# Patient Record
Sex: FEMALE | Race: BLACK OR AFRICAN AMERICAN | NOT HISPANIC OR LATINO
[De-identification: names, ages, dates, MRNs, and addresses within clinical notes are randomized per-mention and may not be internally consistent; named-entity substitution may affect disease eponyms.]

---

## 2022-07-08 ENCOUNTER — APPOINTMENT (OUTPATIENT)
Dept: BARIATRICS | Facility: CLINIC | Age: 62
End: 2022-07-08

## 2022-07-08 VITALS
WEIGHT: 197 LBS | HEIGHT: 62 IN | DIASTOLIC BLOOD PRESSURE: 86 MMHG | BODY MASS INDEX: 36.25 KG/M2 | SYSTOLIC BLOOD PRESSURE: 137 MMHG

## 2022-07-08 DIAGNOSIS — J45.909 UNSPECIFIED ASTHMA, UNCOMPLICATED: ICD-10-CM

## 2022-07-08 DIAGNOSIS — K21.9 GASTRO-ESOPHAGEAL REFLUX DISEASE W/OUT ESOPHAGITIS: ICD-10-CM

## 2022-07-08 PROBLEM — Z00.00 ENCOUNTER FOR PREVENTIVE HEALTH EXAMINATION: Status: ACTIVE | Noted: 2022-07-08

## 2022-07-08 PROCEDURE — 99245 OFF/OP CONSLTJ NEW/EST HI 55: CPT | Mod: NC

## 2022-07-08 NOTE — ASSESSMENT
[FreeTextEntry1] : Assessment \par \par Narration- \par \par \par In preparation of surgery, will order full blood work and vitamin intake, will order esophagram and patient to follow up with our dietitian. Will possibly move forward with gastric bypass. \par \par  \par \par Dr. Florentino Gomez

## 2022-07-08 NOTE — HISTORY OF PRESENT ILLNESS
[de-identified] : Marleen Cantor \par 07/08/2022 \par \par Marleen Cantor, 62 y/o female, a patient in [facility] came in with her Dietitian, patient of Dr. Brown and  Dr. Mitchell from [facility] for an initial consult via telehealth along with our dietitian consultation Samira. Patient is highly intelligent who underwent laparoscopic sleeve gastrectomy in 2010 and at the time went from 241 to 162 and now patient has recidivism approximately at 197 lb giving her  BMI of 36 index. More significantly she reports she  has awaken with aspiration symptoms, post nasal drip, and increase of her asthma medication and she is completely sympathic of GERD. Discussed with the Patient to take 20 mg of Pepcid every night.  Will order an esophagram in order to look into patient's acid reflux  however with respiratory issue conversion of bypass. Divert the bile and acid so it no longer cause the post nasal drip. Retighten the area as well as further weight loss to lower the abdominal pressure. We would want her to come to [place] for one to two possible stay at the hospital and stay at a hotel for the rest  of the week and to follow up with the patient with in that week. Additionally patient will follow up with the dietitian for nutritional guideline. Discussed with the patient the risks, benefits, and  of gastric bypass. Further explained with the patient the risks of bone loss if patient doesn't maintain a healthy diet and taking vitamin supplements. Discussed with the patient the importance of changing to a healthy lifestyle. This surgery is needed due to the complications of her  sleeve gastrectomy and insurance should approve this case and expedite the case as patient needs the gastric bypass.Will order full blood work and vitamin intake to make sure she has vitamin intake.

## 2022-07-18 ENCOUNTER — APPOINTMENT (OUTPATIENT)
Dept: BARIATRICS | Facility: CLINIC | Age: 62
End: 2022-07-18

## 2022-08-17 DIAGNOSIS — Z01.818 ENCOUNTER FOR OTHER PREPROCEDURAL EXAMINATION: ICD-10-CM

## 2022-09-02 VITALS
RESPIRATION RATE: 19 BRPM | HEART RATE: 61 BPM | DIASTOLIC BLOOD PRESSURE: 74 MMHG | HEIGHT: 62 IN | OXYGEN SATURATION: 99 % | SYSTOLIC BLOOD PRESSURE: 110 MMHG | WEIGHT: 194.01 LBS | TEMPERATURE: 98 F

## 2022-09-02 DIAGNOSIS — R94.39 ABNORMAL RESULT OF OTHER CARDIOVASCULAR FUNCTION STUDY: ICD-10-CM

## 2022-09-02 NOTE — PATIENT PROFILE ADULT - FALL HARM RISK - RISK INTERVENTIONS

## 2022-09-02 NOTE — PATIENT PROFILE ADULT - FALL HARM RISK - UNIVERSAL INTERVENTIONS
Bed in lowest position, wheels locked, appropriate side rails in place/Call bell, personal items and telephone in reach/Instruct patient to call for assistance before getting out of bed or chair/Non-slip footwear when patient is out of bed/[place] to call system/Physically safe environment - no spills, clutter or unnecessary equipment/Purposeful Proactive Rounding/Room/bathroom lighting operational, light cord in reach

## 2022-09-05 ENCOUNTER — TRANSCRIPTION ENCOUNTER (OUTPATIENT)
Age: 62
End: 2022-09-05

## 2022-09-06 ENCOUNTER — NON-APPOINTMENT (OUTPATIENT)
Age: 62
End: 2022-09-06

## 2022-09-06 ENCOUNTER — OUTPATIENT (OUTPATIENT)
Dept: OUTPATIENT SERVICES | Facility: HOSPITAL | Age: 62
LOS: 1 days | End: 2022-09-06
Payer: COMMERCIAL

## 2022-09-06 ENCOUNTER — APPOINTMENT (OUTPATIENT)
Dept: BARIATRICS | Facility: HOSPITAL | Age: 62
End: 2022-09-06

## 2022-09-06 ENCOUNTER — APPOINTMENT (OUTPATIENT)
Dept: HEART AND VASCULAR | Facility: CLINIC | Age: 62
End: 2022-09-06

## 2022-09-06 ENCOUNTER — APPOINTMENT (OUTPATIENT)
Dept: CT IMAGING | Facility: HOSPITAL | Age: 62
End: 2022-09-06

## 2022-09-06 ENCOUNTER — INPATIENT (INPATIENT)
Facility: HOSPITAL | Age: 62
LOS: 1 days | Discharge: HOME | DRG: 621 | End: 2022-09-08
Attending: SURGERY | Admitting: SURGERY
Payer: COMMERCIAL

## 2022-09-06 VITALS
BODY MASS INDEX: 35.88 KG/M2 | HEIGHT: 62 IN | DIASTOLIC BLOOD PRESSURE: 56 MMHG | WEIGHT: 195 LBS | HEART RATE: 60 BPM | SYSTOLIC BLOOD PRESSURE: 94 MMHG | OXYGEN SATURATION: 97 %

## 2022-09-06 DIAGNOSIS — Z90.3 ACQUIRED ABSENCE OF STOMACH [PART OF]: Chronic | ICD-10-CM

## 2022-09-06 DIAGNOSIS — E66.01 MORBID (SEVERE) OBESITY DUE TO EXCESS CALORIES: ICD-10-CM

## 2022-09-06 DIAGNOSIS — Z90.710 ACQUIRED ABSENCE OF BOTH CERVIX AND UTERUS: Chronic | ICD-10-CM

## 2022-09-06 DIAGNOSIS — Z98.891 HISTORY OF UTERINE SCAR FROM PREVIOUS SURGERY: Chronic | ICD-10-CM

## 2022-09-06 DIAGNOSIS — J45.909 UNSPECIFIED ASTHMA, UNCOMPLICATED: ICD-10-CM

## 2022-09-06 DIAGNOSIS — K21.9 GASTRO-ESOPHAGEAL REFLUX DISEASE WITHOUT ESOPHAGITIS: ICD-10-CM

## 2022-09-06 PROBLEM — E66.9 OBESITY, UNSPECIFIED: Chronic | Status: ACTIVE | Noted: 2022-09-02

## 2022-09-06 PROBLEM — I10 ESSENTIAL (PRIMARY) HYPERTENSION: Chronic | Status: ACTIVE | Noted: 2022-09-02

## 2022-09-06 PROBLEM — E78.1 PURE HYPERGLYCERIDEMIA: Chronic | Status: ACTIVE | Noted: 2022-09-02

## 2022-09-06 LAB
BLD GP AB SCN SERPL QL: NEGATIVE — SIGNIFICANT CHANGE UP
HCT VFR BLD CALC: 42.2 % — SIGNIFICANT CHANGE UP (ref 34.5–45)
HGB BLD-MCNC: 13.6 G/DL — SIGNIFICANT CHANGE UP (ref 11.5–15.5)
MCHC RBC-ENTMCNC: 28.6 PG — SIGNIFICANT CHANGE UP (ref 27–34)
MCHC RBC-ENTMCNC: 32.2 GM/DL — SIGNIFICANT CHANGE UP (ref 32–36)
MCV RBC AUTO: 88.8 FL — SIGNIFICANT CHANGE UP (ref 80–100)
NRBC # BLD: 0 /100 WBCS — SIGNIFICANT CHANGE UP (ref 0–0)
PLATELET # BLD AUTO: 158 K/UL — SIGNIFICANT CHANGE UP (ref 150–400)
POCT ISTAT CREATININE: 0.9 MG/DL — SIGNIFICANT CHANGE UP (ref 0.5–1.3)
RBC # BLD: 4.75 M/UL — SIGNIFICANT CHANGE UP (ref 3.8–5.2)
RBC # FLD: 12.6 % — SIGNIFICANT CHANGE UP (ref 10.3–14.5)
RH IG SCN BLD-IMP: POSITIVE — SIGNIFICANT CHANGE UP
SARS-COV-2 N GENE NPH QL NAA+PROBE: NOT DETECTED
TROPONIN T SERPL-MCNC: 0.01 NG/ML — SIGNIFICANT CHANGE UP (ref 0–0.01)
TROPONIN T SERPL-MCNC: 0.01 NG/ML — SIGNIFICANT CHANGE UP (ref 0–0.01)
WBC # BLD: 12.09 K/UL — HIGH (ref 3.8–10.5)
WBC # FLD AUTO: 12.09 K/UL — HIGH (ref 3.8–10.5)

## 2022-09-06 PROCEDURE — 93010 ELECTROCARDIOGRAM REPORT: CPT | Mod: 76

## 2022-09-06 PROCEDURE — 82565 ASSAY OF CREATININE: CPT

## 2022-09-06 PROCEDURE — 43848 REVJ OPEN GSTR RSTCV PX: CPT

## 2022-09-06 PROCEDURE — 75574 CT ANGIO HRT W/3D IMAGE: CPT | Mod: 26

## 2022-09-06 PROCEDURE — 75574 CT ANGIO HRT W/3D IMAGE: CPT

## 2022-09-06 PROCEDURE — 99204 OFFICE O/P NEW MOD 45 MIN: CPT

## 2022-09-06 PROCEDURE — 99072 ADDL SUPL MATRL&STAF TM PHE: CPT

## 2022-09-06 PROCEDURE — 43332 TRANSAB ESOPH HIAT HERN RPR: CPT

## 2022-09-06 DEVICE — STAPLER ETHICON ECHELON ENDOPATH GRIP SURFACE 60MM WHITE RELOAD: Type: IMPLANTABLE DEVICE | Status: FUNCTIONAL

## 2022-09-06 DEVICE — STAPLER ETHICON GST ECHELON 60MM BLUE RELOAD: Type: IMPLANTABLE DEVICE | Status: FUNCTIONAL

## 2022-09-06 DEVICE — STAPLER ETHICON ECHELON ENDOPATH 60MM: Type: IMPLANTABLE DEVICE | Status: FUNCTIONAL

## 2022-09-06 RX ORDER — ONDANSETRON 8 MG/1
4 TABLET, FILM COATED ORAL EVERY 8 HOURS
Refills: 0 | Status: DISCONTINUED | OUTPATIENT
Start: 2022-09-06 | End: 2022-09-08

## 2022-09-06 RX ORDER — ACETAMINOPHEN 500 MG
1000 TABLET ORAL ONCE
Refills: 0 | Status: COMPLETED | OUTPATIENT
Start: 2022-09-06 | End: 2022-09-06

## 2022-09-06 RX ORDER — SODIUM CHLORIDE 9 MG/ML
1000 INJECTION, SOLUTION INTRAVENOUS
Refills: 0 | Status: DISCONTINUED | OUTPATIENT
Start: 2022-09-06 | End: 2022-09-07

## 2022-09-06 RX ORDER — ENOXAPARIN SODIUM 100 MG/ML
30 INJECTION SUBCUTANEOUS ONCE
Refills: 0 | Status: COMPLETED | OUTPATIENT
Start: 2022-09-06 | End: 2022-09-06

## 2022-09-06 RX ORDER — FLUOCINOLONE ACETONIDE 0.25 MG/G
1 CREAM TOPICAL
Qty: 0 | Refills: 0 | DISCHARGE

## 2022-09-06 RX ORDER — HYDROMORPHONE HYDROCHLORIDE 2 MG/ML
0.5 INJECTION INTRAMUSCULAR; INTRAVENOUS; SUBCUTANEOUS
Refills: 0 | Status: DISCONTINUED | OUTPATIENT
Start: 2022-09-06 | End: 2022-09-06

## 2022-09-06 RX ORDER — SCOPALAMINE 1 MG/3D
1 PATCH, EXTENDED RELEASE TRANSDERMAL ONCE
Refills: 0 | Status: COMPLETED | OUTPATIENT
Start: 2022-09-06 | End: 2022-09-06

## 2022-09-06 RX ORDER — BUDESONIDE AND FORMOTEROL FUMARATE DIHYDRATE 160; 4.5 UG/1; UG/1
2 AEROSOL RESPIRATORY (INHALATION)
Qty: 0 | Refills: 0 | DISCHARGE

## 2022-09-06 RX ORDER — ALBUTEROL 90 UG/1
2 AEROSOL, METERED ORAL
Qty: 0 | Refills: 0 | DISCHARGE

## 2022-09-06 RX ORDER — INFLUENZA VIRUS VACCINE 15; 15; 15; 15 UG/.5ML; UG/.5ML; UG/.5ML; UG/.5ML
0.5 SUSPENSION INTRAMUSCULAR ONCE
Refills: 0 | Status: DISCONTINUED | OUTPATIENT
Start: 2022-09-06 | End: 2022-09-08

## 2022-09-06 RX ORDER — ACETAMINOPHEN 500 MG
650 TABLET ORAL EVERY 6 HOURS
Refills: 0 | Status: DISCONTINUED | OUTPATIENT
Start: 2022-09-06 | End: 2022-09-08

## 2022-09-06 RX ORDER — PANTOPRAZOLE SODIUM 20 MG/1
40 TABLET, DELAYED RELEASE ORAL DAILY
Refills: 0 | Status: DISCONTINUED | OUTPATIENT
Start: 2022-09-06 | End: 2022-09-08

## 2022-09-06 RX ORDER — ESTROGENS, CONJUGATED 0.625 MG/G
1 CREAM WITH APPLICATOR VAGINAL
Qty: 0 | Refills: 0 | DISCHARGE

## 2022-09-06 RX ORDER — LANOLIN/MINERAL OIL
1 LOTION (ML) TOPICAL
Qty: 0 | Refills: 0 | DISCHARGE

## 2022-09-06 RX ADMIN — SCOPALAMINE 1 PATCH: 1 PATCH, EXTENDED RELEASE TRANSDERMAL at 12:53

## 2022-09-06 RX ADMIN — HYDROMORPHONE HYDROCHLORIDE 0.5 MILLIGRAM(S): 2 INJECTION INTRAMUSCULAR; INTRAVENOUS; SUBCUTANEOUS at 18:10

## 2022-09-06 RX ADMIN — ENOXAPARIN SODIUM 30 MILLIGRAM(S): 100 INJECTION SUBCUTANEOUS at 12:52

## 2022-09-06 RX ADMIN — Medication 400 MILLIGRAM(S): at 22:11

## 2022-09-06 RX ADMIN — SODIUM CHLORIDE 150 MILLILITER(S): 9 INJECTION, SOLUTION INTRAVENOUS at 19:41

## 2022-09-06 RX ADMIN — ONDANSETRON 4 MILLIGRAM(S): 8 TABLET, FILM COATED ORAL at 23:07

## 2022-09-06 RX ADMIN — Medication 1000 MILLIGRAM(S): at 12:53

## 2022-09-06 RX ADMIN — Medication 1000 MILLIGRAM(S): at 22:41

## 2022-09-06 RX ADMIN — HYDROMORPHONE HYDROCHLORIDE 0.5 MILLIGRAM(S): 2 INJECTION INTRAMUSCULAR; INTRAVENOUS; SUBCUTANEOUS at 17:55

## 2022-09-06 NOTE — BRIEF OPERATIVE NOTE - NSICDXBRIEFPROCEDURE_GEN_ALL_CORE_FT
PROCEDURES:  Conversion, gastrectomy, sleeve, to gastric bypass, laparoscopic 06-Sep-2022 17:17:54  Sonal Lawton  Laparoscopic repair, paraesophageal hernia 06-Sep-2022 17:18:17  Sonal Lawton

## 2022-09-06 NOTE — H&P ADULT - NSHPPHYSICALEXAM_GEN_ALL_CORE
Physical Exam:  - Affect: adequate  - GEN: AAOx3, NAD  - ABD: soft and depressible, nontender, nondistended. Prior surgical scars. Obese abdomen.

## 2022-09-06 NOTE — BRIEF OPERATIVE NOTE - NSICDXBRIEFPOSTOP_GEN_ALL_CORE_FT
POST-OP DIAGNOSIS:  Morbid obesity 06-Sep-2022 17:19:10  Sonal Lawton  Bronchial asthma 06-Sep-2022 17:19:17  Sonal Lawton  Paraesophageal hernia 06-Sep-2022 17:19:25  Sonal Lawton  GERD (gastroesophageal reflux disease) 06-Sep-2022 17:19:31  Sonal Lawton

## 2022-09-06 NOTE — H&P ADULT - REASON FOR ADMISSION
62 y/o female patient with refractory morbid obesity after prior sleeve gastrectomy; comes in for bariatric surgery as means to improve her health.

## 2022-09-06 NOTE — PROGRESS NOTE ADULT - SUBJECTIVE AND OBJECTIVE BOX
LSG converted to RYGB with HHR  Dr. Gomez     S: Pt has no complaints. Denies CP, SOB, ARROYO, calf tenderness. Pain controlled with medication.    O:  T(C): 36.4 (09-06-22 @ 18:26), Max: 36.4 (09-06-22 @ 16:56)  T(F): 97.6 (09-06-22 @ 18:26), Max: 97.6 (09-06-22 @ 18:26)  HR: 74 (09-06-22 @ 18:26) (72 - 74)  BP: 120/72 (09-06-22 @ 18:26) (120/72 - 132/70)  RR: 16 (09-06-22 @ 18:26) (16 - 18)  SpO2: 95% (09-06-22 @ 18:26) (95% - 100%)  Wt(kg): --      Gen: NAD, resting comfortably in bed  C/V: NSR  Pulm: Nonlabored breathing, no respiratory distress  Abd: abdomen is soft, nontender, non distended, no rebound or guarding, incisions are clean dry and intact  Extrem: WWP, no calf edema, SCDs in place

## 2022-09-06 NOTE — BRIEF OPERATIVE NOTE - NSICDXBRIEFPREOP_GEN_ALL_CORE_FT
PRE-OP DIAGNOSIS:  History of sleeve gastrectomy 06-Sep-2022 17:18:31  Sonal Lawton  Morbid obesity 06-Sep-2022 17:18:43  Sonal Lawton  GERD (gastroesophageal reflux disease) 06-Sep-2022 17:18:49  Sonal Lawton  Bronchial asthma 06-Sep-2022 17:18:56  Sonal Lawton

## 2022-09-06 NOTE — BRIEF OPERATIVE NOTE - OPERATION/FINDINGS
Laparoscopic sleeve gastrectomy revision and conversion to RXGB with hiatal hernia repair: Intra-abdominal cavity entered with optiview trocar and pneumoperitoneum established without complications. Mild adhesions to midline from prior surgery. Very large paraesophageal hiatal hernia with herniated sleeve up into chest. Completely reduced and primarily repaired during this surgery. Liver seemed well. Sleeve converted to gastric pouch. Alimentary limb 150cm. BP limb on the left and [place] limb on the right. Tension, leak proof (methylene blue) free handsewn gastrojejunal anastomosis. Lembert stitches used to cover suture line. Tension free, H-type side to side entero-enteric anastomosis created. Mesenteric defect and Soriano's defect closed. Hemostasis confirmed. Supraumbilical trocar fascia closed with Vicryl 0 figure of eight. Pneumoperiteum evacuated. Skin closed with monocryl 4-0 and dermabond. Patient tolerated procedure well and was sent to Telemetry unit due to suspected ST elevations noted on monitor. Extubated without complications and arrived at PACU in stable condition.

## 2022-09-06 NOTE — H&P ADULT - NSHPREVIEWOFSYSTEMS_GEN_ALL_CORE
Reports having GERD with associated heartburn, reflux. Denies sleep apnea, persistent diarrhea/vomiting or GI obstruction signs.

## 2022-09-06 NOTE — H&P ADULT - ASSESSMENT
60 y/o female patient with refractory morbid obesity after prior sleeve gastrectomy on 2010 and GERD (suspected Hiatal hernia); comes in for bariatric surgery as means to improve her health. Reports having GERD with associated heartburn, reflux. Denies sleep apnea, persistent diarrhea/vomiting or GI obstruction signs.     Plan:  - To OR for lap sleeve revision, possible hiatal hernia repair and conversion to Gastric Bypass  - Admission to surgery [facility] for perioperative care and monitoring 62 y/o female patient with refractory morbid obesity after prior sleeve gastrectomy on 2010, bronchial asthma, abnormal EKG findings (cleared by cardio 9/6/22 after testing) and GERD (suspected Hiatal hernia); comes in for bariatric surgery as means to improve her health. Reports having GERD with associated heartburn, reflux. Denies sleep apnea, persistent diarrhea/vomiting or GI obstruction signs.     Plan:  - To OR for lap sleeve revision, possible hiatal hernia repair and conversion to Gastric Bypass  - Admission to surgery [facility] for perioperative care and monitoring

## 2022-09-06 NOTE — H&P ADULT - HISTORY OF PRESENT ILLNESS
62 y/o female patient with refractory morbid obesity after prior sleeve gastrectomy on  and GERD (suspected Hiatal hernia); comes in for bariatric surgery as means to improve her health. Reports having GERD with associated heartburn, reflux. Denies sleep apnea, persistent diarrhea/vomiting or GI obstruction signs.     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 Prior surgical scars. Obese abdomen.    Preop labs:  Hgb: 14.6   Hct 42  Cr: 0.90 62 y/o female patient with refractory morbid obesity after prior sleeve gastrectomy on , bronchial asthma, abnormal EKG findings (cleared by cardio 22 after testing) and GERD (suspected Hiatal hernia); comes in for bariatric surgery as means to improve her health. Reports having GERD with associated heartburn, reflux. Denies sleep apnea, persistent diarrhea/vomiting or GI obstruction signs.     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 Prior surgical scars. Obese abdomen.    Preop labs:  Hgb: 14.6   Hct 42  Cr: 0.90

## 2022-09-07 ENCOUNTER — TRANSCRIPTION ENCOUNTER (OUTPATIENT)
Age: 62
End: 2022-09-07

## 2022-09-07 LAB
ALBUMIN SERPL ELPH-MCNC: 3.7 G/DL — SIGNIFICANT CHANGE UP (ref 3.3–5)
ALP SERPL-CCNC: 45 U/L — SIGNIFICANT CHANGE UP (ref 40–120)
ALT FLD-CCNC: 61 U/L — HIGH (ref 10–45)
ANION GAP SERPL CALC-SCNC: 11 MMOL/L — SIGNIFICANT CHANGE UP (ref 5–17)
AST SERPL-CCNC: 62 U/L — HIGH (ref 10–40)
BILIRUB SERPL-MCNC: 0.3 MG/DL — SIGNIFICANT CHANGE UP (ref 0.2–1.2)
BUN SERPL-MCNC: 8 MG/DL — SIGNIFICANT CHANGE UP (ref 7–23)
CALCIUM SERPL-MCNC: 9.2 MG/DL — SIGNIFICANT CHANGE UP (ref 8.4–10.5)
CHLORIDE SERPL-SCNC: 100 MMOL/L — SIGNIFICANT CHANGE UP (ref 96–108)
CO2 SERPL-SCNC: 24 MMOL/L — SIGNIFICANT CHANGE UP (ref 22–31)
CREAT SERPL-MCNC: 0.78 MG/DL — SIGNIFICANT CHANGE UP (ref 0.5–1.3)
EGFR: 86 ML/MIN/1.73M2 — SIGNIFICANT CHANGE UP
GLUCOSE SERPL-MCNC: 117 MG/DL — HIGH (ref 70–99)
HCT VFR BLD CALC: 39.9 % — SIGNIFICANT CHANGE UP (ref 34.5–45)
HCV AB S/CO SERPL IA: 0.05 S/CO — SIGNIFICANT CHANGE UP
HCV AB SERPL-IMP: SIGNIFICANT CHANGE UP
HGB BLD-MCNC: 13 G/DL — SIGNIFICANT CHANGE UP (ref 11.5–15.5)
MAGNESIUM SERPL-MCNC: 1.7 MG/DL — SIGNIFICANT CHANGE UP (ref 1.6–2.6)
MCHC RBC-ENTMCNC: 28.2 PG — SIGNIFICANT CHANGE UP (ref 27–34)
MCHC RBC-ENTMCNC: 32.6 GM/DL — SIGNIFICANT CHANGE UP (ref 32–36)
MCV RBC AUTO: 86.6 FL — SIGNIFICANT CHANGE UP (ref 80–100)
NRBC # BLD: 0 /100 WBCS — SIGNIFICANT CHANGE UP (ref 0–0)
PHOSPHATE SERPL-MCNC: 4.1 MG/DL — SIGNIFICANT CHANGE UP (ref 2.5–4.5)
PLATELET # BLD AUTO: 176 K/UL — SIGNIFICANT CHANGE UP (ref 150–400)
POTASSIUM SERPL-MCNC: 4.4 MMOL/L — SIGNIFICANT CHANGE UP (ref 3.5–5.3)
POTASSIUM SERPL-SCNC: 4.4 MMOL/L — SIGNIFICANT CHANGE UP (ref 3.5–5.3)
PROT SERPL-MCNC: 6.7 G/DL — SIGNIFICANT CHANGE UP (ref 6–8.3)
RBC # BLD: 4.61 M/UL — SIGNIFICANT CHANGE UP (ref 3.8–5.2)
RBC # FLD: 12.7 % — SIGNIFICANT CHANGE UP (ref 10.3–14.5)
SODIUM SERPL-SCNC: 135 MMOL/L — SIGNIFICANT CHANGE UP (ref 135–145)
WBC # BLD: 9.15 K/UL — SIGNIFICANT CHANGE UP (ref 3.8–10.5)
WBC # FLD AUTO: 9.15 K/UL — SIGNIFICANT CHANGE UP (ref 3.8–10.5)

## 2022-09-07 PROCEDURE — 74240 X-RAY XM UPR GI TRC 1CNTRST: CPT | Mod: 26

## 2022-09-07 RX ORDER — SPIRONOLACTONE 25 MG/1
1 TABLET, FILM COATED ORAL
Qty: 0 | Refills: 0 | DISCHARGE

## 2022-09-07 RX ORDER — ESOMEPRAZOLE MAGNESIUM 40 MG/1
1 CAPSULE, DELAYED RELEASE ORAL
Qty: 0 | Refills: 0 | DISCHARGE

## 2022-09-07 RX ORDER — MAGNESIUM SULFATE 500 MG/ML
2 VIAL (ML) INJECTION ONCE
Refills: 0 | Status: COMPLETED | OUTPATIENT
Start: 2022-09-07 | End: 2022-09-07

## 2022-09-07 RX ORDER — SODIUM CHLORIDE 9 MG/ML
1000 INJECTION, SOLUTION INTRAVENOUS
Refills: 0 | Status: DISCONTINUED | OUTPATIENT
Start: 2022-09-07 | End: 2022-09-08

## 2022-09-07 RX ORDER — OMEPRAZOLE 10 MG/1
1 CAPSULE, DELAYED RELEASE ORAL
Qty: 30 | Refills: 0
Start: 2022-09-07 | End: 2022-10-06

## 2022-09-07 RX ORDER — ACETAMINOPHEN 500 MG
2 TABLET ORAL
Qty: 32 | Refills: 0
Start: 2022-09-07 | End: 2022-09-10

## 2022-09-07 RX ADMIN — SODIUM CHLORIDE 75 MILLILITER(S): 9 INJECTION, SOLUTION INTRAVENOUS at 06:43

## 2022-09-07 RX ADMIN — Medication 650 MILLIGRAM(S): at 21:50

## 2022-09-07 RX ADMIN — Medication 25 GRAM(S): at 11:38

## 2022-09-07 RX ADMIN — Medication 650 MILLIGRAM(S): at 15:45

## 2022-09-07 RX ADMIN — Medication 650 MILLIGRAM(S): at 15:12

## 2022-09-07 RX ADMIN — PANTOPRAZOLE SODIUM 40 MILLIGRAM(S): 20 TABLET, DELAYED RELEASE ORAL at 11:38

## 2022-09-07 RX ADMIN — SODIUM CHLORIDE 150 MILLILITER(S): 9 INJECTION, SOLUTION INTRAVENOUS at 00:14

## 2022-09-07 RX ADMIN — SODIUM CHLORIDE 75 MILLILITER(S): 9 INJECTION, SOLUTION INTRAVENOUS at 08:36

## 2022-09-07 RX ADMIN — Medication 650 MILLIGRAM(S): at 04:40

## 2022-09-07 RX ADMIN — SODIUM CHLORIDE 75 MILLILITER(S): 9 INJECTION, SOLUTION INTRAVENOUS at 21:18

## 2022-09-07 RX ADMIN — Medication 650 MILLIGRAM(S): at 21:12

## 2022-09-07 RX ADMIN — SCOPALAMINE 1 PATCH: 1 PATCH, EXTENDED RELEASE TRANSDERMAL at 06:06

## 2022-09-07 RX ADMIN — Medication 650 MILLIGRAM(S): at 05:38

## 2022-09-07 NOTE — DISCHARGE NOTE PROVIDER - NSDCMRMEDTOKEN_GEN_ALL_CORE_FT
Cream Base topical cream: Apply topically to affected area 3 times a week on scalp - (metformin compound)  fluocinolone 0.01% topical solution: Apply topically to affected area 3 times a week on scalp  Premarin 0.625 mg/g vaginal cream with applicator: 1 application vaginal 2 times a week  Symbicort 80 mcg-4.5 mcg/inh inhalation aerosol: 2 puff(s) inhaled 2 times a day  Ventolin HFA 90 mcg/inh inhalation aerosol: 2 puff(s) inhaled every 6 hours, As Needed - for bronchospasm   acetaminophen 160 mg/5 mL oral liquid: 5 milliliter(s) orally every 6 hours, As Needed -for moderate to severe pain MDD:80 ml  Cream Base topical cream: Apply topically to affected area 3 times a week on scalp - (metformin compound)  fluocinolone 0.01% topical solution: Apply topically to affected area 3 times a week on scalp  omeprazole 40 mg oral delayed release capsule: 1 cap(s) orally once a day MDD:1 capsule  Premarin 0.625 mg/g vaginal cream with applicator: 1 application vaginal 2 times a week  Symbicort 80 mcg-4.5 mcg/inh inhalation aerosol: 2 puff(s) inhaled 2 times a day  Ventolin HFA 90 mcg/inh inhalation aerosol: 2 puff(s) inhaled every 6 hours, As Needed - for bronchospasm

## 2022-09-07 NOTE — DISCHARGE NOTE PROVIDER - HOSPITAL COURSE
61 year old female patient with refractory morbid obesity (BMI 35) after prior sleeve gastrectomy on 2010, bronchial asthma, abnormal EKG findings (cleared by cardio 9/6/22 after testing) and GERD (suspected Hiatal hernia); S/P lap sleeve conversion to RYGB with hiatal hernia repair 9/6/22. IntraOp: No surgical complications, minimal EBL but suspected ST segment elevations on monitor. EKG in PACU normal and troponin negative. Pt tolerated the procedure well. At time of discharge, pt was tolerating a bariatric clear liquid diet, and pt's pain was controlled. Plan is to follow up with Dr. Gomez in the office.  1) Please take Tylenol 650 mg every 4 to 6 hours by mouth for moderate pain control. Please do not exceed over 4,000 mg of Tylenol a day.  2) Please take Omeprazole 40 mg once a day by mouth.

## 2022-09-07 NOTE — DISCHARGE NOTE PROVIDER - CARE PROVIDER_API CALL
Florentino Gomez (MD)  Surgery  [address] [place] [address], [place] [address]  Phone: [phone]  Fax: [phone]  Follow Up Time: 1 week

## 2022-09-07 NOTE — PROGRESS NOTE ADULT - NSPROGADDITIONALINFOA_GEN_ALL_CORE
62 y/o female patient with refractory morbid obesity after prior sleeve gastrectomy on 2010, bronchial asthma, abnormal EKG findings (cleared by cardio 9/6/22 after testing) and GERD (suspected Hiatal hernia); S/P lap sleeve conversion to RYGB with hiatal hernia repair 9/6/22. IntraOp: No surgical complications, minimal EBL but suspected ST segment elevations on monitor. Arrived at PACU with stabl VS.       Telemetry  Follow Up Cardio  BCLD/LR @75  Pain + nausea control   PPI   OOBA/IS  SCD  Nutrition consult in AM   AM labs   Patient seen and case discussed in detail with Dr. Gomez

## 2022-09-07 NOTE — DISCHARGE NOTE PROVIDER - NSDCFUADDINST_GEN_ALL_CORE_FT
Follow up with Dr. Gomez in 1 week. Call the office at  to schedule your appointment. You may shower; soap and water over incision sites. Do not scrub. Pat dry when done. No tub bathing or swimming until cleared. Keep incision sites out of the sun as scars will darken. No heavy lifting (>10lbs) or strenuous exercise. Diet: Bariatric Full Fluids. 60 grams protein daily.  64 fluid ounces water daily. Drink small sips throughout the day. Continue diet as outlined by paperwork received as a pre-operative patient. You should be urinating at least 3-4x per day. Call the office if you experience increasing abdominal pain, nausea, vomiting, or temperature >100.4F.  NO ASPIRIN OR NSAIDs until approved by Dr. Gomez. Avoid alcoholic beverages until cleared by Dr. Gomez.    1) Please take Tylenol 650 mg every 4 to 6 hours by mouth for moderate pain control. Please do not exceed over 4,000 mg of Tylenol a day.  2) Please take Omeprazole 40 mg once a day by mouth.

## 2022-09-07 NOTE — PROGRESS NOTE ADULT - SUBJECTIVE AND OBJECTIVE BOX
INTERVAL HPI/OVERNIGHT EVENTS: poc wnl, repeat trops normal, passed TOV, post op hgb 13.6(14.6), sin, vss 9/6: OR, cards consult, post-op EKG w/nl.      STATUS POST:  lap sleeve conversion to RYGB with hiatal hernia repair     POST OPERATIVE DAY #: 1    SUBJECTIVE:          Vital Signs Last 24 Hrs  T(C): 36.4 (07 Sep 2022 04:40), Max: 36.7 (07 Sep 2022 00:09)  T(F): 97.6 (07 Sep 2022 04:40), Max: 98 (07 Sep 2022 00:09)  HR: 65 (07 Sep 2022 04:40) (65 - 79)  BP: 148/83 (07 Sep 2022 04:40) (120/72 - 148/83)  BP(mean): 92 (06 Sep 2022 18:26) (90 - 95)  RR: 17 (07 Sep 2022 04:40) (16 - 18)  SpO2: 97% (07 Sep 2022 04:40) (95% - 100%)    Parameters below as of 07 Sep 2022 04:40  Patient On (Oxygen Delivery Method): room air      I&O's Detail    06 Sep 2022 07:01  -  07 Sep 2022 06:22  --------------------------------------------------------  IN:    IV PiggyBack: 100 mL    Lactated Ringers: 1762.5 mL  Total IN: 1862.5 mL    OUT:    Voided (mL): 650 mL  Total OUT: 650 mL    Total NET: 1212.5 mL          General: NAD, resting comfortably in bed  C/V: NSR  Pulm: Nonlabored breathing, no respiratory distress  Abd: Soft, non-distended, TTP around incision site, incision clean dry and intact.   Extrem: WWP, no edema, SCDs in place      LABS:                        13.6   12.09 )-----------( 158      ( 06 Sep 2022 22:38 )             42.2                  INTERVAL HPI/OVERNIGHT EVENTS: poc wnl, repeat trops normal, passed TOV, post op hgb 13.6(14.6), sin, vss 9/6: OR, cards consult, post-op EKG w/nl.      STATUS POST:  lap sleeve conversion to RYGB with hiatal hernia repair     POST OPERATIVE DAY #: 1    SUBJECTIVE: Patient examined bedside with Dr. Gomez. Patients complain of incisional pain. Patient reports that she is tolerating bariatric clear liquid diet 4oz/hr . Patient reports she is ambulating and using incentive spirometer. Patient denies SOB, chest pain, nausea and emesis. Patient making adequate urine output.           Vital Signs Last 24 Hrs  T(C): 36.4 (07 Sep 2022 04:40), Max: 36.7 (07 Sep 2022 00:09)  T(F): 97.6 (07 Sep 2022 04:40), Max: 98 (07 Sep 2022 00:09)  HR: 65 (07 Sep 2022 04:40) (65 - 79)  BP: 148/83 (07 Sep 2022 04:40) (120/72 - 148/83)  BP(mean): 92 (06 Sep 2022 18:26) (90 - 95)  RR: 17 (07 Sep 2022 04:40) (16 - 18)  SpO2: 97% (07 Sep 2022 04:40) (95% - 100%)    Parameters below as of 07 Sep 2022 04:40  Patient On (Oxygen Delivery Method): room air      I&O's Detail    06 Sep 2022 07:01  -  07 Sep 2022 06:22  --------------------------------------------------------  IN:    IV PiggyBack: 100 mL    Lactated Ringers: 1762.5 mL  Total IN: 1862.5 mL    OUT:    Voided (mL): 650 mL  Total OUT: 650 mL    Total NET: 1212.5 mL          General: NAD, resting comfortably in bed  C/V: NSR  Pulm: Nonlabored breathing, no respiratory distress  Abd: Soft, non-distended, TTP around incision site, incision clean dry and intact.   Extrem: WWP, no edema, SCDs in place      LABS:                        13.6   12.09 )-----------( 158      ( 06 Sep 2022 22:38 )             42.2

## 2022-09-07 NOTE — DISCHARGE NOTE PROVIDER - NSDCCPGOAL_GEN_ALL_CORE_FT
To get better and follow your care plan as instructed.  1) Please take Tylenol 650 mg every 4 to 6 hours by mouth for moderate pain control. Please do not exceed over 4,000 mg of Tylenol a day.  2) Please take Omeprazole 40 mg once a day by mouth.

## 2022-09-07 NOTE — DISCHARGE NOTE PROVIDER - REASON FOR ADMISSION
60 y/o female patient with refractory morbid obesity after prior sleeve gastrectomy; comes in for bariatric surgery as means to improve her health.

## 2022-09-07 NOTE — CHART NOTE - NSCHARTNOTEFT_GEN_A_CORE
Called to review EKG for patient due to reported KAROLINA during procedure earlier in the day. Unfortunately there is no documentation of leads elevations were noted in, nor was there a printed disclosure.   Review of post op EKG - NSR without acute ischemic changes, no ST elevations noted.     Please reconsult if disclosure during procedure with KAROLINA is discovered, at this time not enough information to make further recommendations.     Clyde Powers MD LAURA  Cardiovascular Disease Fellow, PGY-5

## 2022-09-07 NOTE — DISCHARGE NOTE PROVIDER - NSDCFUSCHEDAPPT_GEN_ALL_CORE_FT
[facility]  BAR[facility] [address]  Scheduled Appointment: 09/21/2022

## 2022-09-07 NOTE — DISCHARGE NOTE PROVIDER - NSDCCPCAREPLAN_GEN_ALL_CORE_FT
PRINCIPAL DISCHARGE DIAGNOSIS  Diagnosis: Morbid obesity  Assessment and Plan of Treatment: 61 year old female patient with refractory morbid obesity (BMI 35) after prior sleeve gastrectomy on 2010, bronchial asthma, abnormal EKG findings (cleared by cardio 9/6/22 after testing) and GERD (suspected Hiatal hernia); S/P lap sleeve conversion to RYGB with hiatal hernia repair 9/6/22. IntraOp: No surgical complications, minimal EBL but suspected ST segment elevations on monitor. EKG in PACU normal and troponin negative. Pt tolerated the procedure well. At time of discharge, pt was tolerating a bariatric clear liquid diet, and pt's pain was controlled. Plan is to follow up with Dr. Gomez in the office.  1) Please take Tylenol 650 mg every 4 to 6 hours by mouth for moderate pain control. Please do not exceed over 4,000 mg of Tylenol a day.  2) Please take Omeprazole 40 mg once a day by mouth.

## 2022-09-08 ENCOUNTER — TRANSCRIPTION ENCOUNTER (OUTPATIENT)
Age: 62
End: 2022-09-08

## 2022-09-08 VITALS
OXYGEN SATURATION: 96 % | DIASTOLIC BLOOD PRESSURE: 90 MMHG | SYSTOLIC BLOOD PRESSURE: 147 MMHG | RESPIRATION RATE: 18 BRPM | HEART RATE: 68 BPM | TEMPERATURE: 99 F

## 2022-09-08 LAB
ANION GAP SERPL CALC-SCNC: 12 MMOL/L — SIGNIFICANT CHANGE UP (ref 5–17)
BUN SERPL-MCNC: 6 MG/DL — LOW (ref 7–23)
CALCIUM SERPL-MCNC: 9.5 MG/DL — SIGNIFICANT CHANGE UP (ref 8.4–10.5)
CHLORIDE SERPL-SCNC: 104 MMOL/L — SIGNIFICANT CHANGE UP (ref 96–108)
CO2 SERPL-SCNC: 23 MMOL/L — SIGNIFICANT CHANGE UP (ref 22–31)
CREAT SERPL-MCNC: 0.72 MG/DL — SIGNIFICANT CHANGE UP (ref 0.5–1.3)
EGFR: 94 ML/MIN/1.73M2 — SIGNIFICANT CHANGE UP
GLUCOSE SERPL-MCNC: 89 MG/DL — SIGNIFICANT CHANGE UP (ref 70–99)
HCT VFR BLD CALC: 39 % — SIGNIFICANT CHANGE UP (ref 34.5–45)
HGB BLD-MCNC: 12.8 G/DL — SIGNIFICANT CHANGE UP (ref 11.5–15.5)
MAGNESIUM SERPL-MCNC: 2 MG/DL — SIGNIFICANT CHANGE UP (ref 1.6–2.6)
MCHC RBC-ENTMCNC: 28.3 PG — SIGNIFICANT CHANGE UP (ref 27–34)
MCHC RBC-ENTMCNC: 32.8 GM/DL — SIGNIFICANT CHANGE UP (ref 32–36)
MCV RBC AUTO: 86.1 FL — SIGNIFICANT CHANGE UP (ref 80–100)
NRBC # BLD: 0 /100 WBCS — SIGNIFICANT CHANGE UP (ref 0–0)
PHOSPHATE SERPL-MCNC: 2.8 MG/DL — SIGNIFICANT CHANGE UP (ref 2.5–4.5)
PLATELET # BLD AUTO: 203 K/UL — SIGNIFICANT CHANGE UP (ref 150–400)
POTASSIUM SERPL-MCNC: 4.1 MMOL/L — SIGNIFICANT CHANGE UP (ref 3.5–5.3)
POTASSIUM SERPL-SCNC: 4.1 MMOL/L — SIGNIFICANT CHANGE UP (ref 3.5–5.3)
RBC # BLD: 4.53 M/UL — SIGNIFICANT CHANGE UP (ref 3.8–5.2)
RBC # FLD: 12.8 % — SIGNIFICANT CHANGE UP (ref 10.3–14.5)
SODIUM SERPL-SCNC: 139 MMOL/L — SIGNIFICANT CHANGE UP (ref 135–145)
WBC # BLD: 7 K/UL — SIGNIFICANT CHANGE UP (ref 3.8–10.5)
WBC # FLD AUTO: 7 K/UL — SIGNIFICANT CHANGE UP (ref 3.8–10.5)

## 2022-09-08 PROCEDURE — 80053 COMPREHEN METABOLIC PANEL: CPT

## 2022-09-08 PROCEDURE — 86923 COMPATIBILITY TEST ELECTRIC: CPT

## 2022-09-08 PROCEDURE — 85027 COMPLETE CBC AUTOMATED: CPT

## 2022-09-08 PROCEDURE — C9399: CPT

## 2022-09-08 PROCEDURE — 86900 BLOOD TYPING SEROLOGIC ABO: CPT

## 2022-09-08 PROCEDURE — 83735 ASSAY OF MAGNESIUM: CPT

## 2022-09-08 PROCEDURE — 86901 BLOOD TYPING SEROLOGIC RH(D): CPT

## 2022-09-08 PROCEDURE — 36415 COLL VENOUS BLD VENIPUNCTURE: CPT

## 2022-09-08 PROCEDURE — 93005 ELECTROCARDIOGRAM TRACING: CPT

## 2022-09-08 PROCEDURE — 86850 RBC ANTIBODY SCREEN: CPT

## 2022-09-08 PROCEDURE — 80048 BASIC METABOLIC PNL TOTAL CA: CPT

## 2022-09-08 PROCEDURE — 84484 ASSAY OF TROPONIN QUANT: CPT

## 2022-09-08 PROCEDURE — 84100 ASSAY OF PHOSPHORUS: CPT

## 2022-09-08 PROCEDURE — 74240 X-RAY XM UPR GI TRC 1CNTRST: CPT

## 2022-09-08 PROCEDURE — 86803 HEPATITIS C AB TEST: CPT

## 2022-09-08 PROCEDURE — C1889: CPT

## 2022-09-08 RX ORDER — ACETAMINOPHEN 500 MG
5 TABLET ORAL
Qty: 140 | Refills: 0
Start: 2022-09-08 | End: 2022-09-14

## 2022-09-08 RX ORDER — OMEPRAZOLE 10 MG/1
1 CAPSULE, DELAYED RELEASE ORAL
Qty: 30 | Refills: 0
Start: 2022-09-08 | End: 2022-10-07

## 2022-09-08 RX ADMIN — SODIUM CHLORIDE 75 MILLILITER(S): 9 INJECTION, SOLUTION INTRAVENOUS at 05:05

## 2022-09-08 NOTE — DISCHARGE NOTE NURSING/CASE MANAGEMENT/SOCIAL WORK - NSDCPEFALRISK_GEN_ALL_CORE
For information on Fall & Injury Prevention, visit: [email or web address] OR  [email or web address] OR  https://www.cdc.gov/steadi/patient.html

## 2022-09-08 NOTE — PROGRESS NOTE ADULT - SUBJECTIVE AND OBJECTIVE BOX
INTERVAL HPI/OVERNIGHT EVENTS:  issac diet, -N/-V, OOBA  9/7: tolerating PO, denies nausea/vomiting, ambulating, SDU, UGI this AM - no evidence of leak    STATUS POST:  Conversion of sleeve to RYGB    POST OPERATIVE DAY #: 1    SUBJECTIVE: Patient examined bedside with chief resident. Patients complain of incisional pain. Patient reports that she is tolerating bariatric clear liquid diet 4oz/hr . Patient reports she is ambulating and using incentive spirometer. Patient denies SOB, chest pain, nausea and emesis. Patient making adequate urine output.           Vital Signs Last 24 Hrs  T(C): 36.9 (08 Sep 2022 04:35), Max: 36.9 (08 Sep 2022 04:35)  T(F): 98.5 (08 Sep 2022 04:35), Max: 98.5 (08 Sep 2022 04:35)  HR: 75 (08 Sep 2022 04:35) (59 - 75)  BP: 128/87 (08 Sep 2022 04:35) (126/78 - 146/67)  BP(mean): --  RR: 17 (08 Sep 2022 04:35) (17 - 18)  SpO2: 94% (08 Sep 2022 04:35) (94% - 99%)    Parameters below as of 08 Sep 2022 04:35  Patient On (Oxygen Delivery Method): room air      I&O's Detail    07 Sep 2022 07:01  -  08 Sep 2022 07:00  --------------------------------------------------------  IN:    IV PiggyBack: 50 mL    Lactated Ringers: 1275 mL    Oral Fluid: 550 mL  Total IN: 1875 mL    OUT:    Voided (mL): 950 mL  Total OUT: 950 mL    Total NET: 925 mL          General: NAD, resting comfortably in bed  C/V: NSR  Pulm: Nonlabored breathing, no respiratory distress  Abd:Soft, non-distended, TTP around incision site, incision clean dry and intact.   Extrem: WWP, no edema, SCDs in place      LABS:                        12.8   7.00  )-----------( 203      ( 08 Sep 2022 06:37 )             39.0     09-08    139  |  104  |  x   ----------------------------<  89  4.1   |  23  |  0.72    Ca    9.5      08 Sep 2022 06:37  Phos  2.8     09-08  Mg     2.0     09-08    TPro  6.7  /  Alb  3.7  /  TBili  0.3  /  DBili  x   /  AST  62<H>  /  ALT  61<H>  /  AlkPhos  45  09-07

## 2022-09-08 NOTE — PROGRESS NOTE ADULT - ASSESSMENT
61 year old female patient with refractory morbid obesity (BMI 35) after prior sleeve gastrectomy on 2010, bronchial asthma, abnormal EKG findings (cleared by cardio 9/6/22 after testing) and GERD (suspected Hiatal hernia); S/P lap sleeve conversion to RYGB with hiatal hernia repair 9/6/22. IntraOp: No surgical complications, minimal EBL but suspected ST segment elevations on monitor. EKG in PACU normal and troponin negative.    BCLD/LR @75  Pain + nausea control   PPI   OOBA/IS  SCD  Discharge  AM labs   
62 y/o female patient with refractory morbid obesity after prior sleeve gastrectomy on 2010, bronchial asthma, abnormal EKG findings (cleared by cardio 9/6/22 after testing) and GERD (suspected Hiatal hernia); S/P lap sleeve conversion to RYGB with hiatal hernia repair 9/6/22. IntraOp: No surgical complications, minimal EBL but suspected ST segment elevations on monitor. Arrived at PACU with stabl VS.       Telemetry  Follow Up Cardio  BCLD/LR @  Pain + nausea control   PPI   OOBA/IS  SCD  Nutrition consult in AM   CBC q6hrs after the case  AM labs   
62 y/o female patient with refractory morbid obesity after prior sleeve gastrectomy on 2010, bronchial asthma, abnormal EKG findings (cleared by cardio 9/6/22 after testing) and GERD (suspected Hiatal hernia); S/P lap sleeve conversion to RYGB with hiatal hernia repair 9/6/22. IntraOp: No surgical complications, minimal EBL but suspected ST segment elevations on monitor. Arrived at PACU with stabl VS.       Telemetry  Follow Up Cardio  BCLD/LR @75  Pain + nausea control   PPI   OOBA/IS  SCD  Nutrition consult in AM   AM labs   Patient seen and case discussed in detail with Dr. Gomez

## 2022-09-08 NOTE — DISCHARGE NOTE NURSING/CASE MANAGEMENT/SOCIAL WORK - PATIENT PORTAL LINK FT
You can access the FollowMyHealth Patient Portal offered by [facility] by registering at the following website: [email or web address]. By joining [employer]’s FollowMyHealth portal, you will also be able to view your health information using other applications (apps) compatible with our system.

## 2022-09-08 NOTE — DISCHARGE NOTE NURSING/CASE MANAGEMENT/SOCIAL WORK - DATE OF SECOND COVID-19 BOOSTER
GI Lab Pre-Procedure Worksheet    Patient Name:  Seferino To  Medical Record Number: [id number]   YOB: 1968     Procedure to be performed: colonoscopy    Please indicate below if patient has any of the following conditions/issues:  Morbidly obese:  indicate weight:  (ASC Weight limit = 650 lbs). Height:   BMI > 40:  (see BMI sheet)  Does patient have any acute GI symptoms and/or family/personal hx of ca? No   If yes:  Rectal bleeding, abdominal pain, change in bowel habits, unexplained weight loss? No   Personal history of GI cancer  No   Age >70 or < 45  Yes   Any previous heart surgery/cardiac stent placement/pacemaker or AICD placement  No   History of MI/CHF/CVA  Yes   Coagulopathy or use of anticoagulants PLAVIX (can be assessed through cardio as usual)  CKD with dialysis  No   Dementia/Alzheimer’s disease  No   Any chemotherapy or radiation in the previous 6 months  No   Use of home oxygen  No IF THE PT ANSWERED YES TO ANY OF THE ABOVE QUESTIONS, AN OFFICE VISIT WILL NEED TO BE SCHEDULED.  Egg or Soy Allergy:  no  Can lie on left side: YES  Hip, knee, or shoulder replacement within last 6 months: no Pt will need to contact the surgeon if antibiotics are needed.  Implanted stimulator: No   Mastectomy with lymph node removal: no  Mediport: No  AICD (implantable defibulator): no   Cardiac stent placed within last year: No  Pacemaker: No  Wheelchair bound or other limited mobility: no  Power of  for Healthcare assigned: (Bring copy if not in Epic) no  Legal Guardian assigned: (Bring copy if not in Epic) no   needed: no  Deaf: No  Blind: no  Needed Propofol for prior procedures? no  Any allergies/problems to Demerol, versed or fentanyl? no  Have you ever had any problems with anesthesia/IV Sedation?  Nausea, vomiting, pain, motion sickness? no  Do you have dentures, partials, chipped, or loose teeth? no     29-Jun-2022

## 2022-09-12 DIAGNOSIS — E66.01 MORBID (SEVERE) OBESITY DUE TO EXCESS CALORIES: ICD-10-CM

## 2022-09-12 DIAGNOSIS — K44.9 DIAPHRAGMATIC HERNIA WITHOUT OBSTRUCTION OR GANGRENE: ICD-10-CM

## 2022-09-12 DIAGNOSIS — J45.909 UNSPECIFIED ASTHMA, UNCOMPLICATED: ICD-10-CM

## 2022-09-12 DIAGNOSIS — Z91.018 ALLERGY TO OTHER FOODS: ICD-10-CM

## 2022-09-12 DIAGNOSIS — L65.8 OTHER SPECIFIED NONSCARRING HAIR LOSS: ICD-10-CM

## 2022-09-12 DIAGNOSIS — K21.9 GASTRO-ESOPHAGEAL REFLUX DISEASE WITHOUT ESOPHAGITIS: ICD-10-CM

## 2022-09-12 DIAGNOSIS — Z88.6 ALLERGY STATUS TO ANALGESIC AGENT: ICD-10-CM

## 2022-09-14 ENCOUNTER — APPOINTMENT (OUTPATIENT)
Dept: BARIATRICS | Facility: CLINIC | Age: 62
End: 2022-09-14

## 2022-09-14 VITALS
HEART RATE: 84 BPM | WEIGHT: 189.5 LBS | TEMPERATURE: 97.1 F | BODY MASS INDEX: 34.87 KG/M2 | HEIGHT: 62 IN | OXYGEN SATURATION: 97 % | SYSTOLIC BLOOD PRESSURE: 109 MMHG | DIASTOLIC BLOOD PRESSURE: 72 MMHG

## 2022-09-14 DIAGNOSIS — E66.01 MORBID (SEVERE) OBESITY DUE TO EXCESS CALORIES: ICD-10-CM

## 2022-09-14 PROCEDURE — 99024 POSTOP FOLLOW-UP VISIT: CPT

## 2022-09-14 NOTE — PLAN
[FreeTextEntry1] : Patient is a 61 year old patient s/p VSG in 2010 and s/p conversion to RYGB and paraesophageal hernia repair on 09/06/2022 who is here today for post-op follow up. She is doing well overall. Advised the patient to avoid dairy from her diet. Patient is traveling to [facility] and will get lab work in 3 months.

## 2022-09-14 NOTE — HISTORY OF PRESENT ILLNESS
[de-identified] : Patient is a 61 year old patient s/p VSG in 2010 and s/p conversion to RYGB and paraesophageal hernia repair on 09/06/2022 who is here today for post-op follow up.  She is a patient of  and  from the [facility]. She is doing generally well but c/o mild back and abdominal pain. She had an episode of diarrhea on last Friday which she managed with Pepto Bismol. Acid reflux is now managed. Compliant on diet and vitamin regimen. Patient is doing overall well. Discussed with the patient about hypersensitivity to certain food and suggested to avoid dairy to eliminate lactose intolerance in view of her diarrhea.

## 2022-09-14 NOTE — ADDENDUM
[FreeTextEntry1] : Documented by Roxie Hastings acting as a scribe for LINH Rosenthal on 09/14/2022\par

## 2022-11-14 ENCOUNTER — NON-APPOINTMENT (OUTPATIENT)
Age: 62
End: 2022-11-14

## 2022-11-18 ENCOUNTER — NON-APPOINTMENT (OUTPATIENT)
Age: 62
End: 2022-11-18

## 2022-12-13 PROBLEM — J30.2 OTHER SEASONAL ALLERGIC RHINITIS: Chronic | Status: ACTIVE | Noted: 2022-09-06

## 2022-12-13 PROBLEM — J45.909 UNSPECIFIED ASTHMA, UNCOMPLICATED: Chronic | Status: ACTIVE | Noted: 2022-09-06

## 2023-01-17 ENCOUNTER — RESULT CHARGE (OUTPATIENT)
Age: 63
End: 2023-01-17

## 2023-01-17 ENCOUNTER — APPOINTMENT (OUTPATIENT)
Dept: UROLOGY | Facility: CLINIC | Age: 63
End: 2023-01-17
Payer: COMMERCIAL

## 2023-01-17 VITALS
OXYGEN SATURATION: 98 % | HEART RATE: 65 BPM | RESPIRATION RATE: 16 BRPM | SYSTOLIC BLOOD PRESSURE: 113 MMHG | DIASTOLIC BLOOD PRESSURE: 79 MMHG | TEMPERATURE: 97.3 F

## 2023-01-17 DIAGNOSIS — N39.3 STRESS INCONTINENCE (FEMALE) (MALE): ICD-10-CM

## 2023-01-17 DIAGNOSIS — Z78.9 OTHER SPECIFIED HEALTH STATUS: ICD-10-CM

## 2023-01-17 LAB
BILIRUB UR QL STRIP: NORMAL
CLARITY UR: CLEAR
COLLECTION METHOD: NORMAL
GLUCOSE UR-MCNC: NORMAL
HCG UR QL: 0.2 EU/DL
HGB UR QL STRIP.AUTO: NORMAL
KETONES UR-MCNC: 15
LEUKOCYTE ESTERASE UR QL STRIP: NORMAL
NITRITE UR QL STRIP: NORMAL
PH UR STRIP: 5.5
PROT UR STRIP-MCNC: NORMAL
SP GR UR STRIP: 1.02

## 2023-01-17 PROCEDURE — 99072 ADDL SUPL MATRL&STAF TM PHE: CPT

## 2023-01-17 PROCEDURE — 99204 OFFICE O/P NEW MOD 45 MIN: CPT

## 2023-01-19 LAB — BACTERIA UR CULT: NORMAL

## 2023-01-20 PROBLEM — Z78.9 NON-SMOKER: Status: ACTIVE | Noted: 2023-01-20

## 2023-01-20 PROBLEM — N39.3 FEMALE STRESS INCONTINENCE: Status: RESOLVED | Noted: 2023-01-17 | Resolved: 2023-01-20

## 2023-01-20 NOTE — PHYSICAL EXAM
[General Appearance - Well Developed] : well developed [General Appearance - Well Nourished] : well nourished [Normal Appearance] : normal appearance [Well Groomed] : well groomed [General Appearance - In No Acute Distress] : no acute distress [Edema] : no peripheral edema [Respiration, Rhythm And Depth] : normal respiratory rhythm and effort [Exaggerated Use Of Accessory Muscles For Inspiration] : no accessory muscle use [Abdomen Soft] : soft [Abdomen Tenderness] : non-tender [] : no hepato-splenomegaly [Costovertebral Angle Tenderness] : no ~M costovertebral angle tenderness [Urethral Meatus] : normal urethra [Urinary Bladder Findings] : the bladder was normal on palpation [External Female Genitalia] : normal external genitalia [FreeTextEntry1] : - UH, - cst, no prolapse appreciated. [Normal Station and Gait] : the gait and station were normal for the patient's age

## 2023-01-20 NOTE — ASSESSMENT
[FreeTextEntry1] : \par \par Impression/plan: 62-year-old woman with past surgical history of 2 transobturator slings with persistent stress urinary continence.\par \par 1. Cystoscopy.\par 2. UDS. \par 3.  We did discuss options for her stress incontinence including bulking agent versus autologous fascial sling or retropubic mid urethral sling.

## 2023-01-20 NOTE — HISTORY OF PRESENT ILLNESS
[FreeTextEntry1] : 62-year-old woman with a past medical history of stress urinary incontinence, past surgical history of gastric sleeve and vaginal slings x2.  Patient states in 2010 she had a mid urethral sling for stress incontinence, operative report reviewed it was a transobturator tape sling.  She stated she did not have significant improvement so had a follow-up repeat sling placed after urethral lysis again with a transobturator tape sling based on the operative report.  She states she did not have much benefit from this afterwards and has been dealing with stress urinary continence for the last 8 years or so.  She denies any other irritative obstructive voiding symptoms.  She is desirous of definitive care as her pad usage is roughly 3-4 during the day and she is unhappy with her current voiding pattern specifically as it relates to incontinence.\par \par Postvoid residual today is 50 mL.

## 2023-01-25 ENCOUNTER — APPOINTMENT (OUTPATIENT)
Dept: UROLOGY | Facility: CLINIC | Age: 63
End: 2023-01-25
Payer: COMMERCIAL

## 2023-01-25 VITALS
DIASTOLIC BLOOD PRESSURE: 85 MMHG | HEART RATE: 77 BPM | SYSTOLIC BLOOD PRESSURE: 120 MMHG | TEMPERATURE: 97.2 F | OXYGEN SATURATION: 100 %

## 2023-01-25 LAB
BILIRUB UR QL STRIP: NORMAL
CLARITY UR: CLEAR
COLLECTION METHOD: NORMAL
GLUCOSE UR-MCNC: NORMAL
HCG UR QL: 0.2 EU/DL
HGB UR QL STRIP.AUTO: NORMAL
KETONES UR-MCNC: NORMAL
LEUKOCYTE ESTERASE UR QL STRIP: NORMAL
NITRITE UR QL STRIP: NORMAL
PH UR STRIP: 5.5
PROT UR STRIP-MCNC: NORMAL
SP GR UR STRIP: 1.01

## 2023-01-25 PROCEDURE — 99215 OFFICE O/P EST HI 40 MIN: CPT | Mod: 25

## 2023-01-25 PROCEDURE — 52000 CYSTOURETHROSCOPY: CPT

## 2023-01-25 PROCEDURE — 99072 ADDL SUPL MATRL&STAF TM PHE: CPT

## 2023-01-25 PROCEDURE — 51741 ELECTRO-UROFLOWMETRY FIRST: CPT

## 2023-01-25 PROCEDURE — 51728 CYSTOMETROGRAM W/VP: CPT

## 2023-01-25 PROCEDURE — 51798 US URINE CAPACITY MEASURE: CPT

## 2023-01-25 PROCEDURE — 81003 URINALYSIS AUTO W/O SCOPE: CPT | Mod: QW

## 2023-01-25 PROCEDURE — 51797 INTRAABDOMINAL PRESSURE TEST: CPT

## 2023-01-25 PROCEDURE — 51784 ANAL/URINARY MUSCLE STUDY: CPT

## 2023-01-26 NOTE — HISTORY OF PRESENT ILLNESS
[FreeTextEntry1] : 62-year-old woman with a past medical history of stress urinary incontinence, past surgical history of gastric sleeve and vaginal slings x2. Patient states in 2010 she had a mid urethral sling for stress incontinence, operative report reviewed it was a transobturator tape sling. She stated she did not have significant improvement so had a follow-up repeat sling placed after urethral lysis again with a transobturator tape sling based on the operative report. She states she did not have much benefit from this afterwards and has been dealing with stress urinary continence for the last 8 years or so. She denies any other irritative obstructive voiding symptoms. She is desirous of definitive care as her pad usage is roughly 3-4 during the day and she is unhappy with her current voiding pattern specifically as it relates to incontinence.\par \par Postvoid residual today is 50 mL. \par \par 1/25/23\par \par The patient here today for cysto, UDS, review of results and discussion of tx options. Denies interval change in med/surg history.\par \par Cysto - + CST, otherwise unremarkable. \par \par UDS - \par \par Filling/Storage Phase: First sensation 9 mL, First desire 88 mL, Normal desire 239 mL and Cystometric capacity 327 mL. Involuntary contractions were not present . Pt did not demonstrate stress urinary incontinence. Pt did not demonstrate urge urinary incontinence. Compliance: normal. EMG Activity: normal. \par Additional Comments: removed catheter at capacity and stressed patient. Unable to show stress leak. \par \par Voiding Phase: Qmax 18.8 mL/s , Qmax at Pdet 10.3 mL/s, Qavg 9.4 mL/s, voiding time 36.3 mm:sec, voided volume 327 mL and post void residual 0 mL. \par Additional Comments: pseudodyssynergia. \par \par Urodynamic Interpretation : \par Normal bladder sensation. Normal bladder capacity. Patient experiencing no detrusor instability. The uroflow rate is normal. The uroflow pattern is normal. I am unable to assess if patient has bladder outlet obstruction. The patient has complete bladder emptying, a post void residual of 0 cc. The spincter activity is abnormal dyssynergic.

## 2023-01-26 NOTE — ASSESSMENT
[FreeTextEntry1] : \par \par Impression/plan: 62-year-old woman with past surgical history of 2 transobturator slings with persistent stress urinary continence. She does show + CST. \par \par 1. We discussed options for management of stress urinary incontinence including observation, Kegels exercises, and anti-incontinence pessary versus surgical options. Surgical options discussed include periurethral bulking agent, synthetic sling or autologous fascial sling. RBAPC discussed at length. She has decided to move forward with a synthetic midurethral sling. Risks including, but not limited to, injury to the urethra, injury to the bladder, injury to bowel, injury to nervous and vascular structures, mesh erosion, mesh extrusion, persistent incontinence, recurrent incontinence, fistula formation, bleeding, infection, abdominal/pelvic/vaginal pain, dyspareunia, voiding dysfunction, bladder outlet obstruction, and need for repeat surgery. FDA warning for for use of vaginal mesh was discussed with the patient. Above discussed and questions answered, she has decided to move forward with a MUS. \par \par \par

## 2023-02-24 ENCOUNTER — NON-APPOINTMENT (OUTPATIENT)
Age: 63
End: 2023-02-24

## 2023-02-24 LAB
BACTERIA UR CULT: NORMAL
SARS-COV-2 N GENE NPH QL NAA+PROBE: NOT DETECTED

## 2023-02-24 RX ORDER — ACETAMINOPHEN 500 MG
1000 TABLET ORAL ONCE
Refills: 0 | Status: DISCONTINUED | OUTPATIENT
Start: 2023-02-27 | End: 2023-02-27

## 2023-02-24 NOTE — ASU PATIENT PROFILE, ADULT - PRO INTERPRETER NEED 2
Interventional Radiology Brief Post Procedure Note    Procedure: IR PICC Line SINGLE-LEFT    Proceduralist: Neal Penny MD and Tanisha Eng PA-C    Time Out: Prior to the start of the procedure and with procedural staff participation, I verbally confirmed the patient s identity using two indicators, relevant allergies, that the procedure was appropriate and matched the consent or emergent situation, and that the correct equipment/implants were available. Immediately prior to starting the procedure I conducted the Time Out with the procedural staff and re-confirmed the patient s name, procedure, and site/side. (The Joint Commission universal protocol was followed.)  Yes    Sedation: None. Local Anesthestic used    Findings: Completed placement of 4FR single lumen 46 cm PICC via left basilic vein with tip in the RA.  Aspirates and flushes well.  Heplocked.  Ready for immediate use.      Estimated Blood Loss: Minimal    Fluoroscopy Time: 1.28 minutes    SPECIMENS: None    Complications: 1. None     Condition: Stable    Plan: transport to recovery area for discharge.      Comments: See dictated procedure note for full details.    Tanisha Eng PA-C           English

## 2023-02-24 NOTE — ASU PATIENT PROFILE, ADULT - NSICDXPASTMEDICALHX_GEN_ALL_CORE_FT
PAST MEDICAL HISTORY:  Asthma     GERD (gastroesophageal reflux disease)     HTN (hypertension)     Hyperglyceridemia     Obesity     Seasonal allergies

## 2023-02-24 NOTE — ASU PATIENT PROFILE, ADULT - FALL HARM RISK - UNIVERSAL INTERVENTIONS
Bed in lowest position, wheels locked, appropriate side rails in place/Call bell, personal items and telephone in reach/Instruct patient to call for assistance before getting out of bed or chair/Non-slip footwear when patient is out of bed/[place] to call system/Physically safe environment - no spills, clutter or unnecessary equipment/Purposeful Proactive Rounding/Room/bathroom lighting operational, light cord in reach

## 2023-02-26 ENCOUNTER — TRANSCRIPTION ENCOUNTER (OUTPATIENT)
Age: 63
End: 2023-02-26

## 2023-02-26 NOTE — ASU DISCHARGE PLAN (ADULT/PEDIATRIC) - NS MD DC FALL RISK RISK
For information on Fall & Injury Prevention, visit: [email or web address] OR  [email or web address] OR  https://www.cdc.gov/steadi/patient.html

## 2023-02-26 NOTE — ASU DISCHARGE PLAN (ADULT/PEDIATRIC) - ASU DC SPECIAL INSTRUCTIONSFT
TRANSVAGINAL SURGERY    SURGICAL WOUND: There are often lumps and bumps that can be felt in the vagina on either or both sides up to two (2) months or more post operatively. These are of no concern and with time they will soften and disappear.  Any “black and blue” bruising areas will also resolve. There may also be some vaginal bleeding during this time. A liner should be used for the next few weeks, unless bathing, to capture any bleeding. You may apply an ice-pack for 15 minutes out of every hour for the first 24 -36 hours to minimize pain and swelling.    STITCHES: The stitches in the incision will dissolve and fall out by themselves. Sometimes skin stitches may open, allowing a slight gaping of the incision. This is no problem if you keep the area clean.    CATHETER: Some patients are sent home with a Gonzalez catheter, while others go home urinating on their own. A Gonzalez catheter continuously drains the urine from the bladder. If you still have a catheter, the nurses will review instructions and care before you go home.    PAIN: You may have some intermittent pain for up to six (6) weeks post operatively. Pain does not signify any problem unless associated with fever, chills, or inability to void.  If you experience any fevers or chills please call immediately as this may be signs of an infection. You may take Tylenol (acetaminophen) 650-975mg and/or Motrin (ibuprofen) 400-600mg, both available over the counter, for pain every 6 hours as needed. Do not exceed 4000mg of Tylenol (acetaminophen) daily. You may alternate these medications such that you take one or the other every 3 hours for around the clock pain coverage. For severe pain, take Percocet 5/325mg every 6 hours.     ANTIBIOTICS: You may be given a prescription for an antibiotic, please take this medication as instructed and be sure to complete the entire course.     STOOL SOFTENERS: Do not allow yourself to become constipated as straining may cause bleeding. Take stool softeners or a laxative (ex. Miralax, Colace, Senokot, ExLax, etc), available over the counter, if taking Percocet.     ANTICOAGULATION: If you are taking any blood thinning medications, please discuss with your urologist prior to restarting these medications unless otherwise specified.    BATHING: You may sponge bath 24 hours after surgery, but minimize water to the surgical incision.    DIET: You may resume your regular diet and regular medication regimen.    ACTIVITY: No heavy lifting or strenuous exercise until you are evaluated at your post-operative appointment. Otherwise, you may return to your usual level of physical activity.    FOLLOW-UP: If you did not already schedule your post-operative appointment, please call your urologist to schedule and follow-up appointment.    CALL YOUR UROLOGIST IF: You have any bleeding that does not stop, inability to void >8 hours, fever over 100.4 F, chills, persistent nausea/vomiting, changes in your incision concerning for infection, or if your pain is not controlled on your discharge pain medications. TRANSVAGINAL SURGERY    SURGICAL WOUND: There are often lumps and bumps that can be felt in the vagina on either or both sides up to two (2) months or more post operatively. These are of no concern and with time they will soften and disappear.  Any “black and blue” bruising areas will also resolve. There may also be some vaginal bleeding during this time. A liner should be used for the next few weeks, unless bathing, to capture any bleeding. You may apply an ice-pack for 15 minutes out of every hour for the first 24 -36 hours to minimize pain and swelling.    STITCHES: The stitches in the incision will dissolve and fall out by themselves. Sometimes skin stitches may open, allowing a slight gaping of the incision. This is no problem if you keep the area clean.    PAIN: You may have some intermittent pain for up to six (6) weeks post operatively. Pain does not signify any problem unless associated with fever, chills, or inability to void.  If you experience any fevers or chills please call immediately as this may be signs of an infection. You may take Tylenol (acetaminophen) 650-975mg, for pain every 6 hours as needed. Do not exceed 4000mg of Tylenol (acetaminophen) daily. For severe pain, take Percocet 5/325mg every 6 hours, remember that Percocet also includes acetaminophen    STOOL SOFTENERS: Do not allow yourself to become constipated as straining may cause bleeding. Take stool softeners or a laxative (ex. Miralax, Colace, Senokot, ExLax, etc), available over the counter, if taking Percocet.     ANTICOAGULATION: If you are taking any blood thinning medications, please discuss with your urologist prior to restarting these medications unless otherwise specified.    BATHING: You may shower or bath regularly    DIET: You may resume your regular diet and regular medication regimen.    ACTIVITY: No heavy lifting or strenuous exercise until you are evaluated at your post-operative appointment. Otherwise, you may return to your usual level of physical activity.    FOLLOW-UP: If you did not already schedule your post-operative appointment, please call your urologist to schedule and follow-up appointment.    CALL YOUR UROLOGIST IF: You have any bleeding that does not stop, inability to void >8 hours, fever over 100.4 F, chills, persistent nausea/vomiting, changes in your incision concerning for infection, or if your pain is not controlled on your discharge pain medications.

## 2023-02-26 NOTE — ASU DISCHARGE PLAN (ADULT/PEDIATRIC) - CARE PROVIDER_API CALL
Kandace [facility])  Female Pelvic MedReconst Surg; Urology  [address] [place] [address]  Phone: [phone]  Fax: [phone]  Follow Up Time: 1 week   Kandace [facility])  Female Pelvic MedReconst Surg; Urology  [address] [place] [address]  Phone: [phone]  Fax: [phone]  Follow Up Time: 1 month

## 2023-02-26 NOTE — ASU DISCHARGE PLAN (ADULT/PEDIATRIC) - PROVIDER TOKENS
PROVIDER:[TOKEN:[[id number]],FOLLOWUP:[1 week]] PROVIDER:[TOKEN:[[id number]],FOLLOWUP:[1 month]]

## 2023-02-27 ENCOUNTER — TRANSCRIPTION ENCOUNTER (OUTPATIENT)
Age: 63
End: 2023-02-27

## 2023-02-27 ENCOUNTER — APPOINTMENT (OUTPATIENT)
Dept: UROLOGY | Facility: AMBULATORY SURGERY CENTER | Age: 63
End: 2023-02-27

## 2023-02-27 ENCOUNTER — OUTPATIENT (OUTPATIENT)
Dept: OUTPATIENT SERVICES | Facility: HOSPITAL | Age: 63
LOS: 1 days | Discharge: HOME | End: 2023-02-27
Payer: COMMERCIAL

## 2023-02-27 VITALS
WEIGHT: 156.31 LBS | SYSTOLIC BLOOD PRESSURE: 104 MMHG | RESPIRATION RATE: 16 BRPM | DIASTOLIC BLOOD PRESSURE: 68 MMHG | HEIGHT: 62 IN | OXYGEN SATURATION: 100 % | TEMPERATURE: 98 F | HEART RATE: 69 BPM

## 2023-02-27 VITALS
OXYGEN SATURATION: 100 % | DIASTOLIC BLOOD PRESSURE: 77 MMHG | TEMPERATURE: 98 F | SYSTOLIC BLOOD PRESSURE: 124 MMHG | HEART RATE: 75 BPM | RESPIRATION RATE: 18 BRPM

## 2023-02-27 DIAGNOSIS — Z90.710 ACQUIRED ABSENCE OF BOTH CERVIX AND UTERUS: Chronic | ICD-10-CM

## 2023-02-27 DIAGNOSIS — Z98.891 HISTORY OF UTERINE SCAR FROM PREVIOUS SURGERY: Chronic | ICD-10-CM

## 2023-02-27 DIAGNOSIS — Z90.3 ACQUIRED ABSENCE OF STOMACH [PART OF]: Chronic | ICD-10-CM

## 2023-02-27 PROCEDURE — 57288 REPAIR BLADDER DEFECT: CPT

## 2023-02-27 DEVICE — SLING FEMALE DESARA: Type: IMPLANTABLE DEVICE | Status: FUNCTIONAL

## 2023-02-27 RX ORDER — FENTANYL CITRATE 50 UG/ML
25 INJECTION INTRAVENOUS
Refills: 0 | Status: DISCONTINUED | OUTPATIENT
Start: 2023-02-27 | End: 2023-02-27

## 2023-02-27 RX ORDER — OXYCODONE AND ACETAMINOPHEN 5; 325 MG/1; MG/1
1 TABLET ORAL
Qty: 5 | Refills: 0
Start: 2023-02-27

## 2023-02-27 RX ORDER — ONDANSETRON 8 MG/1
4 TABLET, FILM COATED ORAL ONCE
Refills: 0 | Status: DISCONTINUED | OUTPATIENT
Start: 2023-02-27 | End: 2023-02-27

## 2023-02-27 RX ORDER — SODIUM CHLORIDE 9 MG/ML
1000 INJECTION, SOLUTION INTRAVENOUS
Refills: 0 | Status: DISCONTINUED | OUTPATIENT
Start: 2023-02-27 | End: 2023-02-27

## 2023-02-27 RX ADMIN — FENTANYL CITRATE 25 MICROGRAM(S): 50 INJECTION INTRAVENOUS at 12:47

## 2023-02-27 RX ADMIN — FENTANYL CITRATE 25 MICROGRAM(S): 50 INJECTION INTRAVENOUS at 12:32

## 2023-02-27 NOTE — BRIEF OPERATIVE NOTE - OPERATION/FINDINGS
Narrow vaginal introitus, thin vaginal mucosa noted. retropubic sling placed. Cysto confirmed normal. parker placed at end of case. 2 stab incisions suprapubic closed with dermabond

## 2023-02-27 NOTE — BRIEF OPERATIVE NOTE - NSICDXBRIEFPROCEDURE_GEN_ALL_CORE_FT
PROCEDURES:  Creation of urethral sling using polypropylene tape by retropubic approach 27-Feb-2023 11:36:43  Kendall Godwin

## 2023-02-28 ENCOUNTER — APPOINTMENT (OUTPATIENT)
Dept: UROLOGY | Facility: CLINIC | Age: 63
End: 2023-02-28
Payer: COMMERCIAL

## 2023-02-28 VITALS
RESPIRATION RATE: 17 BRPM | DIASTOLIC BLOOD PRESSURE: 75 MMHG | TEMPERATURE: 97.3 F | WEIGHT: 155 LBS | BODY MASS INDEX: 28.52 KG/M2 | HEIGHT: 62 IN | OXYGEN SATURATION: 99 % | SYSTOLIC BLOOD PRESSURE: 121 MMHG | HEART RATE: 74 BPM

## 2023-02-28 PROCEDURE — 99024 POSTOP FOLLOW-UP VISIT: CPT

## 2023-02-28 NOTE — PHYSICAL EXAM
[General Appearance - Well Developed] : well developed [General Appearance - Well Nourished] : well nourished [Normal Appearance] : normal appearance [Well Groomed] : well groomed [General Appearance - In No Acute Distress] : no acute distress [Abdomen Soft] : soft [Abdomen Tenderness] : non-tender [Costovertebral Angle Tenderness] : no ~M costovertebral angle tenderness [Edema] : no peripheral edema [] : no respiratory distress [Respiration, Rhythm And Depth] : normal respiratory rhythm and effort [Exaggerated Use Of Accessory Muscles For Inspiration] : no accessory muscle use [Oriented To Time, Place, And Person] : oriented to person, place, and time [Affect] : the affect was normal [Mood] : the mood was normal [Not Anxious] : not anxious [Normal Station and Gait] : the gait and station were normal for the patient's age [FreeTextEntry1] : surgical incision clean, dry and intact.

## 2023-02-28 NOTE — ASSESSMENT
[FreeTextEntry1] : \par Impression/plan: 62 year-old female s/p MUS on 2/27.\par \par PVR 5 ml.\par \par 1. F/u for POA in 1 month. \par

## 2023-02-28 NOTE — HISTORY OF PRESENT ILLNESS
[FreeTextEntry1] : 62-year-old woman with a past medical history of stress urinary incontinence, past surgical history of gastric sleeve and vaginal slings x2. Patient states in 2010 she had a mid urethral sling for stress incontinence, operative report reviewed it was a transobturator tape sling. She stated she did not have significant improvement so had a follow-up repeat sling placed after urethral lysis again with a transobturator tape sling based on the operative report. She states she did not have much benefit from this afterwards and has been dealing with stress urinary continence for the last 8 years or so. She denies any other irritative obstructive voiding symptoms. She is desirous of definitive care as her pad usage is roughly 3-4 during the day and she is unhappy with her current voiding pattern specifically as it relates to incontinence.\par \par Postvoid residual today is 50 mL. \par \par 1/25/23\par \par The patient here today for cysto, UDS, review of results and discussion of tx options. Denies interval change in med/surg history.\par \par Cysto - + CST, otherwise unremarkable. \par \par UDS - \par \par Filling/Storage Phase: First sensation 9 mL, First desire 88 mL, Normal desire 239 mL and Cystometric capacity 327 mL. Involuntary contractions were not present . Pt did not demonstrate stress urinary incontinence. Pt did not demonstrate urge urinary incontinence. Compliance: normal. EMG Activity: normal. \par Additional Comments: removed catheter at capacity and stressed patient. Unable to show stress leak. \par \par Voiding Phase: Qmax 18.8 mL/s , Qmax at Pdet 10.3 mL/s, Qavg 9.4 mL/s, voiding time 36.3 mm:sec, voided volume 327 mL and post void residual 0 mL. \par Additional Comments: pseudodyssynergia. \par \par Urodynamic Interpretation : \par Normal bladder sensation. Normal bladder capacity. Patient experiencing no detrusor instability. The uroflow rate is normal. The uroflow pattern is normal. I am unable to assess if patient has bladder outlet obstruction. The patient has complete bladder emptying, a post void residual of 0 cc. The spincter activity is abnormal dyssynergic. \par \par 2/28/23\par \par Patient presents to the office post-op day 1 for a PVR check. Patient had MUS place yesterday 2/27/23. PVR in PACU was  230 ml. PVR today was 5 ml. Patient reports that she is doing well. REports that the pain is manageable and that there is still some tenderness but it is decreasing. Denies difficulty urinating. Does still have pink tinged urine that she reports is getting lighter. \par

## 2023-03-17 NOTE — ASU PATIENT PROFILE, ADULT - PRESSURE ULCER(S)
[Dear  ___] : Dear  [unfilled], [Courtesy Letter:] : I had the pleasure of seeing your patient, [unfilled], in my office today. [Please see my note below.] : Please see my note below. [FreeTextEntry2] : Neil Grubbs DO\par [address]  [address]\[place] [address]  [FreeTextEntry3] : Sincerely,\par \par \par \par \par Sidney Bower MD, FACS\par Chief of Urology, [facility]\par  of Urology\par Director of Robotic and Laparoscopic Surgery\par [facility]\par \par [facility]\par [address] [place] [address]\[place] [address]\par P: [phone]\par F: [phone]\par [facility] no

## 2023-03-28 ENCOUNTER — APPOINTMENT (OUTPATIENT)
Dept: UROLOGY | Facility: CLINIC | Age: 63
End: 2023-03-28
Payer: COMMERCIAL

## 2023-03-28 VITALS
DIASTOLIC BLOOD PRESSURE: 76 MMHG | HEART RATE: 88 BPM | TEMPERATURE: 98 F | SYSTOLIC BLOOD PRESSURE: 137 MMHG | OXYGEN SATURATION: 98 %

## 2023-03-28 DIAGNOSIS — N39.3 STRESS INCONTINENCE (FEMALE) (MALE): ICD-10-CM

## 2023-03-28 PROCEDURE — 99024 POSTOP FOLLOW-UP VISIT: CPT

## 2023-03-28 NOTE — HISTORY OF PRESENT ILLNESS
[FreeTextEntry1] : 62-year-old woman who is now 1 month status post mid urethral sling for stress urinary continence.  She is delighted to report she is no longer incontinent, she is very happy with the outcome of surgery.  She had initially some vaginal bleeding which now has resolved, little bit of vaginal discharge is noted from time to time.  She denies any pain.\par \par On exam, she has no urethral mobility, negative cough stress test, sutures are intact, incision healing well, no sign of mesh extrusion noted.\par \par Plan: \par At this point she may resume all activities.  Advised it might take a couple more weeks for the sutures to fully dissolve.  At this point she can see me on an as-needed basis.

## 2025-04-29 ENCOUNTER — APPOINTMENT (OUTPATIENT)
Dept: URBAN - METROPOLITAN AREA CLINIC 132 | Facility: CLINIC | Age: 65
Setting detail: DERMATOLOGY
End: 2025-04-29

## 2025-04-29 DIAGNOSIS — L65.8 OTHER SPECIFIED NONSCARRING HAIR LOSS: ICD-10-CM

## 2025-04-29 PROCEDURE — ? PRESCRIPTION

## 2025-04-29 PROCEDURE — ? ADDITIONAL NOTES

## 2025-04-29 PROCEDURE — 99204 OFFICE O/P NEW MOD 45 MIN: CPT

## 2025-04-29 PROCEDURE — ? COUNSELING

## 2025-04-29 RX ORDER — FINASTERIDE 5 MG/1
TABLET, FILM COATED ORAL
Qty: 90 | Refills: 2 | Status: ERX | COMMUNITY
Start: 2025-04-29

## 2025-04-29 RX ORDER — MINOXIDIL 2.5 MG/1
TABLET ORAL
Qty: 90 | Refills: 3 | Status: ERX | COMMUNITY
Start: 2025-04-29

## 2025-04-29 RX ADMIN — FINASTERIDE: 5 TABLET, FILM COATED ORAL at 00:00

## 2025-04-29 RX ADMIN — MINOXIDIL: 2.5 TABLET ORAL at 00:00

## 2025-04-29 ASSESSMENT — LOCATION SIMPLE DESCRIPTION DERM: LOCATION SIMPLE: ANTERIOR SCALP

## 2025-04-29 ASSESSMENT — LOCATION ZONE DERM: LOCATION ZONE: SCALP

## 2025-04-29 ASSESSMENT — LOCATION DETAILED DESCRIPTION DERM: LOCATION DETAILED: MID-FRONTAL SCALP

## (undated) DEVICE — POLISHER OR CAUTERY TIP

## (undated) DEVICE — DRAPE LAVH 124" X 30" X125"

## (undated) DEVICE — GLV 8 PROTEXIS (WHITE)

## (undated) DEVICE — POSITIONER SAGE MOBILITY TRANSFER PAD

## (undated) DEVICE — SOL IRR POUR NS 0.9% 500ML

## (undated) DEVICE — DRAPE LEGGINGS XL

## (undated) DEVICE — TUBING RANGER FLUID IRRIGATION SET DISP

## (undated) DEVICE — SUT MONOCRYL 4-0 27" PS-2 UNDYED

## (undated) DEVICE — STAPLER ECHELON FLEX POWERED PLUS 440MM

## (undated) DEVICE — DRSG DERMABOND 0.7ML

## (undated) DEVICE — SUT VICRYL 2-0 27" CT-2 UNDYED

## (undated) DEVICE — GLV 7.5 PROTEXIS (WHITE)

## (undated) DEVICE — LIGASURE MARYLAND 37CM

## (undated) DEVICE — TIP METZENBAUM SCISSOR MONOPOLAR ENDOCUT (ORANGE)

## (undated) DEVICE — NDL HYPO REGULAR BEVEL 25G X 1.5" (BLUE)

## (undated) DEVICE — FOLEY TRAY 16FR 5CC LF UMETER CLOSED

## (undated) DEVICE — SUT VICRYL 0 27" CT-2 UNDYED

## (undated) DEVICE — Device

## (undated) DEVICE — TUBING STRYKER PNEUMOCLEAR HIGH FLOW

## (undated) DEVICE — PACK CYSTO

## (undated) DEVICE — WARMING BLANKET UPPER ADULT

## (undated) DEVICE — PACK GENERAL LAPAROSCOPY

## (undated) DEVICE — STAPLER ECHELON FLEX POWERED PLUS 340MM

## (undated) DEVICE — SUT VICRYL 0 27" SH UNDYED

## (undated) DEVICE — TROCAR ETHICON ENDOPATH XCEL UNIVERSAL SLEEVE WITH OPTIVIEW 5MM X 100MM

## (undated) DEVICE — SYR LUER LOK 20CC

## (undated) DEVICE — LONE STAR RETRACTOR RING 32.5CM X 18.3CM DISP

## (undated) DEVICE — VENODYNE/SCD SLEEVE CALF MEDIUM

## (undated) DEVICE — DRAPE INSTRUMENT POUCH 6.75" X 11"

## (undated) DEVICE — VAGINAL PACKING 2"

## (undated) DEVICE — SUT PDS II 2-0 27" SH

## (undated) DEVICE — PACK GENERAL MINOR

## (undated) DEVICE — TROCAR ETHICON ENDOPATH XCEL BLADELESS 15MM X 100MM STABILITY

## (undated) DEVICE — MARKING PEN W RULER

## (undated) DEVICE — SUT PROLENE 2-0 36" SH

## (undated) DEVICE — TROCAR ETHICON ENDOPATH XCEL BLADELESS 5MM X 100MM STABILITY

## (undated) DEVICE — SUT VICRYL 0 54" TIES

## (undated) DEVICE — STAPLER COVIDIEN ENDO GIA XL HANDLE

## (undated) DEVICE — BLADE SURGICAL #15 CARBON

## (undated) DEVICE — SOL IRR BAG NS 0.9% 3000ML

## (undated) DEVICE — SYR LUER LOK 30CC

## (undated) DEVICE — POSITIONER FOAM EGG CRATE ULNAR 2PCS (PINK)

## (undated) DEVICE — GLV 6.5 PROTEXIS (WHITE)

## (undated) DEVICE — TUBING TUR 2 PRONG

## (undated) DEVICE — VENODYNE/SCD SLEEVE CALF LARGE

## (undated) DEVICE — TROCAR ETHICON ENDOPATH XCEL BLADELESS 12MM X 100MM STABILITY

## (undated) DEVICE — TROCAR ETHICON ENDOPATH XCEL UNIVERSAL SLEEVE 12MM X 100M STABILITY